# Patient Record
Sex: MALE | URBAN - METROPOLITAN AREA
[De-identification: names, ages, dates, MRNs, and addresses within clinical notes are randomized per-mention and may not be internally consistent; named-entity substitution may affect disease eponyms.]

---

## 2024-12-17 ENCOUNTER — OFFICE VISIT (OUTPATIENT)
Age: 29
End: 2024-12-17
Payer: COMMERCIAL

## 2024-12-17 ENCOUNTER — APPOINTMENT (OUTPATIENT)
Age: 29
End: 2024-12-17
Payer: COMMERCIAL

## 2024-12-17 VITALS — BODY MASS INDEX: 38.49 KG/M2 | HEIGHT: 68 IN | WEIGHT: 254 LBS

## 2024-12-17 DIAGNOSIS — M54.2 NECK PAIN: Primary | ICD-10-CM

## 2024-12-17 DIAGNOSIS — G89.29 CHRONIC BILATERAL LOW BACK PAIN WITHOUT SCIATICA: ICD-10-CM

## 2024-12-17 DIAGNOSIS — M54.6 RIGHT-SIDED THORACIC BACK PAIN, UNSPECIFIED CHRONICITY: ICD-10-CM

## 2024-12-17 DIAGNOSIS — M54.50 CHRONIC BILATERAL LOW BACK PAIN WITHOUT SCIATICA: ICD-10-CM

## 2024-12-17 PROCEDURE — 99203 OFFICE O/P NEW LOW 30 MIN: CPT | Performed by: CHIROPRACTOR

## 2024-12-17 PROCEDURE — 72110 X-RAY EXAM L-2 SPINE 4/>VWS: CPT

## 2024-12-17 PROCEDURE — 72040 X-RAY EXAM NECK SPINE 2-3 VW: CPT
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
## 2024-12-23 ENCOUNTER — PROCEDURE VISIT (OUTPATIENT)
Age: 29
End: 2024-12-23
Payer: COMMERCIAL

## 2024-12-23 VITALS — WEIGHT: 254 LBS | BODY MASS INDEX: 38.49 KG/M2 | HEIGHT: 68 IN

## 2024-12-23 DIAGNOSIS — M54.6 RIGHT-SIDED THORACIC BACK PAIN, UNSPECIFIED CHRONICITY: Primary | ICD-10-CM

## 2024-12-23 DIAGNOSIS — M54.50 CHRONIC BILATERAL LOW BACK PAIN WITHOUT SCIATICA: ICD-10-CM

## 2024-12-23 DIAGNOSIS — M54.2 NECK PAIN: ICD-10-CM

## 2024-12-23 DIAGNOSIS — G89.29 CHRONIC BILATERAL LOW BACK PAIN WITHOUT SCIATICA: ICD-10-CM

## 2024-12-23 PROCEDURE — 98941 CHIROPRACT MANJ 3-4 REGIONS: CPT | Performed by: CHIROPRACTOR

## 2024-12-23 NOTE — PROGRESS NOTES
Assessment:     1. Right-sided thoracic back pain, unspecified chronicity        2. Neck pain        3. Chronic bilateral low back pain without sciatica            Condition is the same.    PLAN/TREATMENT:    Return for treatment later this week.     Continue stretching. and Patient was given stretches to do at home twice daily.    Subjective:  Kelton is here today with complaints of neck and back pain.  He feels slightly better he thinks due to being off the last few days from both of his jobs.  .    Objective:  Moderate palpable spasm and tenderness is noted in the bilateral upper trapezius, bilateral levator scapula, bilateral rhomboids, bilateral cervical and upper thoracic paraspinals.     Moderate spasm and tenderness is noted in the lumbar paraspinals, quadratus lumborum and gluteus medius bilaterally.     Joint dysfunction is present at the right sacroiliac joint, L5-S1, T9-10, T5-6, T3-4, C5-6, C6-7.    Study Result    Narrative & Impression   XR SPINE CERVICAL 2 OR 3 VW INJURY     INDICATION: M54.2: Cervicalgia.     COMPARISON: None     FINDINGS:     No acute fracture or subluxation.     Straightening of the usual cervical lordosis.     Intervertebral disc heights are preserved.     Normal prevertebral soft tissues.     Clear lung apices.     IMPRESSION:     No acute osseous abnormality.     Study Result    Narrative & Impression   XR SPINE LUMBAR MINIMUM 4 VIEWS NON INJURY     INDICATION: M54.50: Low back pain, unspecified  G89.29: Other chronic pain.     COMPARISON: None     FINDINGS:     No acute fracture. Intact pedicles.     Five non-rib-bearing lumbar vertebral bodies.     Normal alignment. No instability on flexion/extension views.     No significant degenerative changes.     Unremarkable soft tissues.     IMPRESSION:     No acute osseous abnormality.     Reviewed results of x-rays with patient.

## 2024-12-26 ENCOUNTER — OFFICE VISIT (OUTPATIENT)
Age: 29
End: 2024-12-26
Payer: COMMERCIAL

## 2024-12-26 VITALS — BODY MASS INDEX: 38.49 KG/M2 | HEIGHT: 68 IN | WEIGHT: 254 LBS

## 2024-12-26 DIAGNOSIS — M54.50 CHRONIC BILATERAL LOW BACK PAIN WITHOUT SCIATICA: ICD-10-CM

## 2024-12-26 DIAGNOSIS — M54.2 NECK PAIN: ICD-10-CM

## 2024-12-26 DIAGNOSIS — M54.6 RIGHT-SIDED THORACIC BACK PAIN, UNSPECIFIED CHRONICITY: Primary | ICD-10-CM

## 2024-12-26 DIAGNOSIS — G89.29 CHRONIC BILATERAL LOW BACK PAIN WITHOUT SCIATICA: ICD-10-CM

## 2024-12-26 PROCEDURE — 98941 CHIROPRACT MANJ 3-4 REGIONS: CPT | Performed by: CHIROPRACTOR

## 2024-12-26 PROCEDURE — 97110 THERAPEUTIC EXERCISES: CPT | Performed by: CHIROPRACTOR

## 2024-12-26 NOTE — PROGRESS NOTES
"Assessment:     1. Right-sided thoracic back pain, unspecified chronicity        2. Neck pain        3. Chronic bilateral low back pain without sciatica              Condition is slightly better.    PLAN/TREATMENT:    Return for treatment once next week. ( I am out of the office in the beginning of the week )    Continue stretching.     Treatment:  Myofascial release including IAST to the levator scapula, rhomboids upper trapezius and thoracic paraspinals was performedM for 10 minutes including stretching and range of motion exercises to improve flexibility and range of motion.    Manipulation was performed to the right sacroiliac joint, L5-S1 utilizing Fatima drop technique and side-lying flexion distraction.  Manipulation was performed to the thoracic spine at T3-4 and T5-6 utilizing a gentle double thenar contact.  Manipulation was performed to the cervical spine at C5-6, C6-7 utilizing stairstepping technique.  Gentle manual traction was applied to the cervical spine.    Subjective:  Kelton is here today with complaints of neck and back pain.  He feels  the pain is decreased today rating his pain as a 5/10.  Severe pain in LB is less frequent.  Felt \"great\" after last visit.     Objective:  Moderate palpable spasm and tenderness is noted in the bilateral upper trapezius, bilateral levator scapula, bilateral rhomboids, bilateral cervical and upper thoracic paraspinals.     Moderate spasm and tenderness is noted in the lumbar paraspinals, quadratus lumborum and gluteus medius bilaterally.     Joint dysfunction is present at the right sacroiliac joint, L5-S1, T9-10, T5-6, T3-4, C5-6, C6-7.    "

## 2025-01-07 ENCOUNTER — PROCEDURE VISIT (OUTPATIENT)
Age: 30
End: 2025-01-07
Payer: COMMERCIAL

## 2025-01-07 VITALS — HEIGHT: 68 IN | BODY MASS INDEX: 38.49 KG/M2 | WEIGHT: 254 LBS

## 2025-01-07 DIAGNOSIS — G89.29 CHRONIC BILATERAL LOW BACK PAIN WITHOUT SCIATICA: ICD-10-CM

## 2025-01-07 DIAGNOSIS — M54.2 NECK PAIN: ICD-10-CM

## 2025-01-07 DIAGNOSIS — M54.6 RIGHT-SIDED THORACIC BACK PAIN, UNSPECIFIED CHRONICITY: Primary | ICD-10-CM

## 2025-01-07 DIAGNOSIS — M54.50 CHRONIC BILATERAL LOW BACK PAIN WITHOUT SCIATICA: ICD-10-CM

## 2025-01-07 PROCEDURE — 97110 THERAPEUTIC EXERCISES: CPT | Performed by: CHIROPRACTOR

## 2025-01-07 PROCEDURE — 98941 CHIROPRACT MANJ 3-4 REGIONS: CPT | Performed by: CHIROPRACTOR

## 2025-01-07 NOTE — PROGRESS NOTES
Assessment:     1. Right-sided thoracic back pain, unspecified chronicity        2. Neck pain        3. Chronic bilateral low back pain without sciatica            Condition is slightly better.    PLAN/TREATMENT:    Return for treatment later this week or early next week.   Continue stretching.     Treatment:  Myofascial release including IAST to the levator scapula, rhomboids upper trapezius and thoracic paraspinals was performedM for 10 minutes including stretching and range of motion exercises to improve flexibility and range of motion.    Manipulation was performed to the right sacroiliac joint, L5-S1 utilizing Fatima drop technique and side-lying flexion distraction.  Manipulation was performed to the thoracic spine at T3-4 and T5-6 utilizing a gentle double thenar contact.  Manipulation was performed to the cervical spine at C5-6, C6-7 utilizing stairstepping technique.  Gentle manual traction was applied to the cervical spine.    Subjective:  Kelton is here today with complaints of neck and back pain.  He feels  the pain is not as good as after last visit.  He had almost two weeks of and back was feeling better overall.  Returned to work at both jobs at the end of last week and pain returned a bit throughout the back.     Objective:  Moderate palpable spasm and tenderness is noted in the bilateral upper trapezius, bilateral levator scapula, bilateral rhomboids, bilateral cervical and upper thoracic paraspinals.     Moderate spasm and tenderness is noted in the lumbar paraspinals, quadratus lumborum and gluteus medius bilaterally.    Trigger points are noted in the rhomboids and upper trapezius bilaterally.      Joint dysfunction is present at the right sacroiliac joint, L5-S1, T9-10, T5-6, T3-4, C5-6, C6-7.